# Patient Record
(demographics unavailable — no encounter records)

---

## 2024-10-16 NOTE — DISCUSSION/SUMMARY
[de-identified] : HEP and PT.  Ibuprofen 800.  The patient's orthopaedic condition(s) warrants intermittent use of a prescription strength non-steroidal anti-inflammatory medication.  These medications are associated with risks including but not limited to gastrointestinal irritation, kidney damage, hypertension, and bleeding.  The patient understands and will take medications as prescribed.  The patient will stop the medication and consult a physician as needed if problems arise.  Follow up 4 weeks.

## 2024-10-16 NOTE — HISTORY OF PRESENT ILLNESS
[de-identified] : 10/28/23: Ms Dell Raman is a 14 y/o RHD F (seen with father) c/o L ankle s/p a fall off a playground yesterday when she twisted her ankle. She states she heard a "Pop" upon landing. She has been taking Ibuprofen 800mg and icing PRN she has not had any formal imaging. She states she has diatl N/T  DOi: 10/27/23 11/1/23: follow up left ankle.  left knee stable. 11/15/23: mild improvement but still some pain and stiffness. 12/13/23:  left knee and left ankle pain persists. 1/3/24: still some pain. 2/28/24: still some pain left knee. 4/24/24: cont left knee pain. PT helping when she gets it. 8/28/24: some recurrent left knee pain. no new injury. [6] : 6 [2] : 2 [] : no [FreeTextEntry1] : Left knee [FreeTextEntry3] : ~ 10/9/24 [FreeTextEntry5] : Patient reports doing a squat and hearing a crack/pop sound when getting up and even when walking now  [FreeTextEntry6] : swelling [FreeTextEntry7] : up and down left leg [FreeTextEntry8] : school  [FreeTextEntry9] : ibuprofen  [de-identified] : weather and after a long day  [de-identified] : none [de-identified] : MRI  [de-identified] : [facility] [de-identified] : 9

## 2024-10-16 NOTE — ASSESSMENT
[FreeTextEntry1] : left knee effusion without injury starting around 5/3/23. aspirated on 5/4/23. negative for infection, crystals.  lyme negative per mother. mri 2023 shows PF maltracking and small bone edema lateral plateau with possible small chondral defect. mri 2024 shows mild bone edema.  seems like recurrent pf subluxation.  discussed surgical options.  left ankle sprain on 10/28/23.  mri shows high grade sprain, os trigoum. seeing dr garibay for moderate to severe ankle sprain.  mild increase in esr, crp. uncertain source.

## 2024-10-30 NOTE — HISTORY OF PRESENT ILLNESS
[de-identified] : 10/28/23: Ms Dell Raman is a 12 y/o RHD F (seen with father) c/o L ankle s/p a fall off a playground yesterday when she twisted her ankle. She states she heard a "Pop" upon landing. She has been taking Ibuprofen 800mg and icing PRN she has not had any formal imaging. She states she has diatl N/T  DOi: 10/27/23 11/1/23: follow up left ankle.  left knee stable. 11/15/23: mild improvement but still some pain and stiffness. 12/13/23:  left knee and left ankle pain persists. 1/3/24: still some pain. 2/28/24: still some pain left knee. 4/24/24: cont left knee pain. PT helping when she gets it. 8/28/24: some recurrent left knee pain. no new injury. 10/30/24: still pain left knee. giving way and pain. [] : no [FreeTextEntry1] : Left knee [FreeTextEntry3] : ~ 10/9/24 [FreeTextEntry5] : Follow up on LT knee pain. Patient states the pain is worse and is present when she is resting or moving. There is more swelling. Pain is localized but around the knee. The back of the knee is in pain now too. Patient has been icing it and taking ibuprofen. Patient is wearing a soft brace for support. Pain is constant and sharp [FreeTextEntry6] : swelling [FreeTextEntry7] : up and down left leg [FreeTextEntry8] : school  [FreeTextEntry9] : ibuprofen  [de-identified] : weather and after a long day  [de-identified] : none [de-identified] : MRI  [de-identified] : [facility] [de-identified] : 9

## 2024-10-30 NOTE — ASSESSMENT
[FreeTextEntry1] : left knee effusion without injury starting around 5/3/23. aspirated on 5/4/23. negative for infection, crystals.  lyme negative per mother. mri 2023 shows PF maltracking and small bone edema lateral plateau with possible small chondral defect. mri 2024 shows mild bone edema.  seems like recurrent pf subluxation.  discussed surgical options.  recurrent pain with failed non op treatment.  left ankle sprain on 10/28/23.  mri shows high grade sprain, os trigoum. seeing dr garibay for moderate to severe ankle sprain.  mild increase in esr, crp. uncertain source.

## 2024-10-30 NOTE — DISCUSSION/SUMMARY
[de-identified] : Discussed problem and options with patient, mom and dad. 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  Will proceed with left knee scope, mpfl imbrication and repair.  Discussed risks  of continued pain, recurrence and need for furthre surgery. The risks, benefits, alternatives, and likely post operative course were discussed in detail with the patient, including but not limited to infection, neurovascular injury, chondral damage or chondrolysis, possible failure of surgery and need for further treatment, medical and anesthetic complications, loss of limb and loss of life.  The patient understands and wishes to proceed with surgery.

## 2024-10-30 NOTE — PHYSICAL EXAM
[Left] : left knee [NL (0)] : extension 0 degrees [4___] : quadriceps 4[unfilled]/5 [5___] : hamstring 5[unfilled]/5 [] : antalgic [TWNoteComboBox7] : flexion 110 degrees

## 2024-11-27 NOTE — ASSESSMENT
[FreeTextEntry1] : s/p left knee medial imbrication and chondroplasty [facility] on 11/18/24.

## 2024-11-27 NOTE — HISTORY OF PRESENT ILLNESS
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  [de-identified] : 11/18/24 [de-identified] : LT knee arthroscopy  used

## 2024-12-18 NOTE — DISCUSSION/SUMMARY
[de-identified] : The patient has one or more conditions that would benefit from physical therapy.  The physical therapy is requested to improve any deficit in pain, range of motion, strength and other problems in the affected body part(s) as noted in the physical examination above.  The patient's orthopaedic condition warrants consideration of intermittent use of over-the-counter medications such as advil, alleve, tylenol and similar agents.  The patient is aware to use the medications only as directed on the bottle and should contact a physician should they encounter any problems.  Follow up 4 weeks.

## 2024-12-18 NOTE — ASSESSMENT
[FreeTextEntry1] : s/p left knee medial imbrication and chondroplasty [facility] on 11/18/24.  Improved but still some pain stiffness.

## 2024-12-18 NOTE — HISTORY OF PRESENT ILLNESS
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  [] : no [FreeTextEntry1] : LT knee  [FreeTextEntry9] : advil, stim [de-identified] : after PT, at night  [de-identified] : ROM brace, walker, PT, meds  pt reports mild spasms in the left lateral thigh/hip area.  [de-identified] : 11/18/24 [de-identified] : LT knee arthroscopy

## 2024-12-18 NOTE — PHYSICAL EXAM
[Left] : left knee [NL (0)] : extension 0 degrees [3___] : quadriceps 3[unfilled]/5 [4___] : hamstring 4[unfilled]/5 [] : ambulation with crutches [de-identified] : in  [TWNoteComboBox7] : flexion 90 degrees

## 2024-12-18 NOTE — PHYSICAL EXAM
[Left] : left knee [NL (0)] : extension 0 degrees [3___] : quadriceps 3[unfilled]/5 [4___] : hamstring 4[unfilled]/5 [] : ambulation with crutches [de-identified] : in  [TWNoteComboBox7] : flexion 90 degrees

## 2024-12-18 NOTE — HISTORY OF PRESENT ILLNESS
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  [] : no [FreeTextEntry1] : LT knee  [FreeTextEntry9] : advil, stim [de-identified] : after PT, at night  [de-identified] : ROM brace, walker, PT, meds  pt reports mild spasms in the left lateral thigh/hip area.  [de-identified] : 11/18/24 [de-identified] : LT knee arthroscopy

## 2024-12-18 NOTE — DISCUSSION/SUMMARY
[de-identified] : The patient has one or more conditions that would benefit from physical therapy.  The physical therapy is requested to improve any deficit in pain, range of motion, strength and other problems in the affected body part(s) as noted in the physical examination above.  The patient's orthopaedic condition warrants consideration of intermittent use of over-the-counter medications such as advil, alleve, tylenol and similar agents.  The patient is aware to use the medications only as directed on the bottle and should contact a physician should they encounter any problems.  Follow up 4 weeks.

## 2025-01-15 NOTE — PHYSICAL EXAM
[Left] : left knee [NL (0)] : extension 0 degrees [3___] : quadriceps 3[unfilled]/5 [4___] : hamstring 4[unfilled]/5 [] : ambulation with crutches [de-identified] : in  [TWNoteComboBox7] : flexion 120 degrees

## 2025-01-15 NOTE — DISCUSSION/SUMMARY
Pt still anemic  Continue iron supplement  Normal diabetic test [de-identified] : The patient cannot participate in sports or gym at this time. 5 minute pass, books, elevators.   The patient has one or more conditions that would benefit from physical therapy.  The physical therapy is requested to improve any deficit in pain, range of motion, strength and other problems in the affected body part(s) as noted in the physical examination above. Left knee.   Follow up 6 weeks,

## 2025-01-15 NOTE — HISTORY OF PRESENT ILLNESS
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 1/15/25:  Improving but still some pain. [] : no [FreeTextEntry1] : LT knee  [FreeTextEntry5] : PO#3 for LT knee, patient still has brace on. Patient states pain has come down a little. When overusing it at school it can be a 5.  [FreeTextEntry9] : advil, stim [de-identified] : after PT, at night  [de-identified] : ROM brace, walker, PT, meds  pt reports mild spasms in the left lateral thigh/hip area.  [de-identified] : 11/18/24 [de-identified] : LT knee arthroscopy

## 2025-02-26 NOTE — PHYSICAL EXAM
[Left] : left knee [NL (0)] : extension 0 degrees [4___] : hamstring 4[unfilled]/5 [] : light touch is intact throughout [de-identified] : in  [TWNoteComboBox7] : flexion 125 degrees

## 2025-02-26 NOTE — DISCUSSION/SUMMARY
[de-identified] : The patient has one or more conditions that would benefit from physical therapy.  The physical therapy is requested to improve any deficit in pain, range of motion, strength and other problems in the affected body part(s) as noted in the physical examination above.  A prescription for physical therapy 2 - 3 times per week for 6 weeks was prescribed for the following body parts. Left knee.   The patient's orthopaedic condition warrants consideration of intermittent use of over-the-counter medications such as advil, alleve, tylenol and similar agents.  The patient is aware to use the medications only as directed on the bottle and should contact a physician should they encounter any problems.  The patient may attend school with appropriate accomodiations (5 minute pass).   Follow up 6 weeks.

## 2025-02-26 NOTE — HISTORY OF PRESENT ILLNESS
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 1/15/25:  Improving but still some pain. 2/26/25: Improved but still pain. [] : no [FreeTextEntry1] : LT knee  [FreeTextEntry5] : PO#3 for LT knee, patient still wears brace to school. Patient states pain has come down a little. When overusing it at school it can be a 5.  [FreeTextEntry9] : advil, stim [de-identified] : after PT, at night  [de-identified] : ROM brace, walker, PT, meds  pt reports mild spasms in the left lateral thigh/hip area.  [de-identified] : 11/18/24 [de-identified] : LT knee arthroscopy

## 2025-02-26 NOTE — DISCUSSION/SUMMARY
[de-identified] : The patient has one or more conditions that would benefit from physical therapy.  The physical therapy is requested to improve any deficit in pain, range of motion, strength and other problems in the affected body part(s) as noted in the physical examination above.  A prescription for physical therapy 2 - 3 times per week for 6 weeks was prescribed for the following body parts. Left knee.   The patient's orthopaedic condition warrants consideration of intermittent use of over-the-counter medications such as advil, alleve, tylenol and similar agents.  The patient is aware to use the medications only as directed on the bottle and should contact a physician should they encounter any problems.  The patient may attend school with appropriate accomodiations (5 minute pass).   Follow up 6 weeks.

## 2025-02-26 NOTE — HISTORY OF PRESENT ILLNESS
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 1/15/25:  Improving but still some pain. 2/26/25: Improved but still pain. [] : no [FreeTextEntry1] : LT knee  [FreeTextEntry5] : PO#3 for LT knee, patient still wears brace to school. Patient states pain has come down a little. When overusing it at school it can be a 5.  [FreeTextEntry9] : advil, stim [de-identified] : after PT, at night  [de-identified] : ROM brace, walker, PT, meds  pt reports mild spasms in the left lateral thigh/hip area.  [de-identified] : 11/18/24 [de-identified] : LT knee arthroscopy